# Patient Record
Sex: FEMALE | Race: WHITE
[De-identification: names, ages, dates, MRNs, and addresses within clinical notes are randomized per-mention and may not be internally consistent; named-entity substitution may affect disease eponyms.]

---

## 2018-11-23 ENCOUNTER — HOSPITAL ENCOUNTER (EMERGENCY)
Dept: HOSPITAL 19 - COL.ER | Age: 23
Discharge: HOME | End: 2018-11-23
Payer: COMMERCIAL

## 2018-11-23 VITALS — BODY MASS INDEX: 33.39 KG/M2 | HEIGHT: 65 IN | WEIGHT: 200.4 LBS

## 2018-11-23 VITALS — HEART RATE: 95 BPM

## 2018-11-23 VITALS — SYSTOLIC BLOOD PRESSURE: 122 MMHG | DIASTOLIC BLOOD PRESSURE: 73 MMHG | TEMPERATURE: 99.9 F

## 2018-11-23 DIAGNOSIS — J20.9: Primary | ICD-10-CM

## 2018-11-23 DIAGNOSIS — F32.9: ICD-10-CM

## 2018-11-23 DIAGNOSIS — J45.909: ICD-10-CM

## 2020-02-23 ENCOUNTER — HOSPITAL ENCOUNTER (EMERGENCY)
Dept: HOSPITAL 19 - COL.ER | Age: 25
Discharge: HOME | End: 2020-02-23
Payer: COMMERCIAL

## 2020-02-23 VITALS — DIASTOLIC BLOOD PRESSURE: 58 MMHG | TEMPERATURE: 98.4 F | HEART RATE: 98 BPM | SYSTOLIC BLOOD PRESSURE: 94 MMHG

## 2020-02-23 VITALS — WEIGHT: 220.46 LBS | HEIGHT: 65 IN | BODY MASS INDEX: 36.73 KG/M2

## 2020-02-23 DIAGNOSIS — F41.9: ICD-10-CM

## 2020-02-23 DIAGNOSIS — S39.012A: Primary | ICD-10-CM
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
## 2021-06-28 NOTE — NUR
PATIENT BROUGHT BACK TO BAY 2 VIA CART.  PLACED ON MONITORS, VITAL SIGNS
STABLE.  PATIENT IS AWAKE AND ORIENTED.  STATES PAIN IS 4/10 TO ABDOMEN.  LAP
SITES TO ABDOMEN CLEAN DRY INTACT.  IV INFUSING WITHOUT DIFFICULTY.
REQUESTING WATER AND APPLESAUCE.  CALL BELL WITHIN REACH, PARENTS AT BEDSIDE.
ALL SAFETY MAINTAINED.  WILL MONITOR.

## 2021-06-28 NOTE — NUR
PATIENT STATES SHE FEELS READY TO USE RESTROOM, AMBULATED WITHOUT DIFFICULTY.
PATIENT ABLE TO VOID.  STATES SHES READY TO GO HOME AT THIS TIME.

## 2021-06-28 NOTE — NUR
PATIENT TOLERATED FOOD AND DRINK WITHOUT DIFFICULTY.  PAIN MEDICATION GIVEN
PER ORDERS.  WILL MONITOR.

## 2021-06-28 NOTE — NUR
DISCHARGE INSTRUCTIONS REVIEWED WITH PATIENT AND PARENTS.  ALL QUESTIONS
ANSWERED.  NEW MEDICATIONS REVIEWED.  PATIENT BROUGHT DOWN TO LOBBY VIA WHEEL
CHAIR.  FATHER TO DRIVE PATIENT HOME.  ALL BELONGINGS IN HAND.